# Patient Record
Sex: FEMALE | Race: WHITE
[De-identification: names, ages, dates, MRNs, and addresses within clinical notes are randomized per-mention and may not be internally consistent; named-entity substitution may affect disease eponyms.]

---

## 2019-10-03 ENCOUNTER — HOSPITAL ENCOUNTER (OUTPATIENT)
Dept: HOSPITAL 88 - ER | Age: 59
Setting detail: OBSERVATION
Discharge: LEFT BEFORE DISCHARGE | End: 2019-10-03
Attending: INTERNAL MEDICINE | Admitting: INTERNAL MEDICINE
Payer: COMMERCIAL

## 2019-10-03 VITALS — WEIGHT: 175 LBS | BODY MASS INDEX: 31.01 KG/M2 | HEIGHT: 63 IN

## 2019-10-03 DIAGNOSIS — N39.0: ICD-10-CM

## 2019-10-03 DIAGNOSIS — I10: ICD-10-CM

## 2019-10-03 DIAGNOSIS — I25.110: Primary | ICD-10-CM

## 2019-10-03 DIAGNOSIS — R10.13: ICD-10-CM

## 2019-10-03 DIAGNOSIS — F32.9: ICD-10-CM

## 2019-10-03 DIAGNOSIS — R11.0: ICD-10-CM

## 2019-10-03 DIAGNOSIS — G47.00: ICD-10-CM

## 2019-10-03 LAB
ALBUMIN SERPL-MCNC: 4.1 G/DL (ref 3.5–5)
ALBUMIN/GLOB SERPL: 1 {RATIO} (ref 0.8–2)
ALP SERPL-CCNC: 89 IU/L (ref 40–150)
ALT SERPL-CCNC: 27 IU/L (ref 0–55)
AMYLASE SERPL-CCNC: 81 U/L (ref 25–125)
ANION GAP SERPL CALC-SCNC: 13.8 MMOL/L (ref 8–16)
BACTERIA URNS QL MICRO: (no result) /HPF
BASOPHILS # BLD AUTO: 0 10*3/UL (ref 0–0.1)
BASOPHILS NFR BLD AUTO: 0.3 % (ref 0–1)
BILIRUB UR QL: NEGATIVE
BUN SERPL-MCNC: 10 MG/DL (ref 7–26)
BUN/CREAT SERPL: 14 (ref 6–25)
CALCIUM SERPL-MCNC: 10.6 MG/DL (ref 8.4–10.2)
CHLORIDE SERPL-SCNC: 101 MMOL/L (ref 98–107)
CK MB SERPL-MCNC: 0.6 NG/ML (ref 0–5)
CK MB SERPL-MCNC: 1 NG/ML (ref 0–5)
CK SERPL-CCNC: 31 IU/L (ref 29–168)
CK SERPL-CCNC: 38 IU/L (ref 29–168)
CLARITY UR: (no result)
CO2 SERPL-SCNC: 28 MMOL/L (ref 22–29)
COLOR UR: YELLOW
DEPRECATED NEUTROPHILS # BLD AUTO: 7 10*3/UL (ref 2.1–6.9)
DEPRECATED RBC URNS MANUAL-ACNC: (no result) /HPF (ref 0–5)
EGFRCR SERPLBLD CKD-EPI 2021: > 60 ML/MIN (ref 60–?)
EOSINOPHIL # BLD AUTO: 0.2 10*3/UL (ref 0–0.4)
EOSINOPHIL NFR BLD AUTO: 1.8 % (ref 0–6)
EPI CELLS URNS QL MICRO: (no result) /LPF
ERYTHROCYTE [DISTWIDTH] IN CORD BLOOD: 12.1 % (ref 11.7–14.4)
GLOBULIN PLAS-MCNC: 4 G/DL (ref 2.3–3.5)
GLUCOSE SERPLBLD-MCNC: 111 MG/DL (ref 74–118)
HCT VFR BLD AUTO: 41.6 % (ref 34.2–44.1)
HGB BLD-MCNC: 14.2 G/DL (ref 12–16)
KETONES UR QL STRIP.AUTO: NEGATIVE
LEUKOCYTE ESTERASE UR QL STRIP.AUTO: (no result)
LIPASE SERPL-CCNC: 102 U/L (ref 8–78)
LYMPHOCYTES # BLD: 3.3 10*3/UL (ref 1–3.2)
LYMPHOCYTES NFR BLD AUTO: 26.9 % (ref 18–39.1)
MCH RBC QN AUTO: 30.1 PG (ref 28–32)
MCHC RBC AUTO-ENTMCNC: 34.1 G/DL (ref 31–35)
MCV RBC AUTO: 88.3 FL (ref 81–99)
MONOCYTES # BLD AUTO: 1.5 10*3/UL (ref 0.2–0.8)
MONOCYTES NFR BLD AUTO: 12.2 % (ref 4.4–11.3)
NEUTS SEG NFR BLD AUTO: 58.2 % (ref 38.7–80)
NITRITE UR QL STRIP.AUTO: NEGATIVE
PLATELET # BLD AUTO: 372 X10E3/UL (ref 140–360)
POTASSIUM SERPL-SCNC: 3.8 MMOL/L (ref 3.5–5.1)
PROT UR QL STRIP.AUTO: NEGATIVE
RBC # BLD AUTO: 4.71 X10E6/UL (ref 3.6–5.1)
RENAL EPI CELLS URNS QL MICRO: (no result)
SODIUM SERPL-SCNC: 139 MMOL/L (ref 136–145)
SP GR UR STRIP: >=1.03 (ref 1.01–1.02)
UROBILINOGEN UR STRIP-MCNC: 0.2 MG/DL (ref 0.2–1)
WBC #/AREA URNS HPF: (no result) /HPF (ref 0–5)

## 2019-10-03 PROCEDURE — 36415 COLL VENOUS BLD VENIPUNCTURE: CPT

## 2019-10-03 PROCEDURE — 83690 ASSAY OF LIPASE: CPT

## 2019-10-03 PROCEDURE — 87086 URINE CULTURE/COLONY COUNT: CPT

## 2019-10-03 PROCEDURE — 82150 ASSAY OF AMYLASE: CPT

## 2019-10-03 PROCEDURE — 85379 FIBRIN DEGRADATION QUANT: CPT

## 2019-10-03 PROCEDURE — 82550 ASSAY OF CK (CPK): CPT

## 2019-10-03 PROCEDURE — 93005 ELECTROCARDIOGRAM TRACING: CPT

## 2019-10-03 PROCEDURE — 81001 URINALYSIS AUTO W/SCOPE: CPT

## 2019-10-03 PROCEDURE — 80053 COMPREHEN METABOLIC PANEL: CPT

## 2019-10-03 PROCEDURE — 74176 CT ABD & PELVIS W/O CONTRAST: CPT

## 2019-10-03 PROCEDURE — 99284 EMERGENCY DEPT VISIT MOD MDM: CPT

## 2019-10-03 PROCEDURE — 71046 X-RAY EXAM CHEST 2 VIEWS: CPT

## 2019-10-03 PROCEDURE — 84484 ASSAY OF TROPONIN QUANT: CPT

## 2019-10-03 PROCEDURE — 85025 COMPLETE CBC W/AUTO DIFF WBC: CPT

## 2019-10-03 PROCEDURE — 82553 CREATINE MB FRACTION: CPT

## 2019-10-03 NOTE — NUR
pt spoke with dimitris case and stated she needed her iv removed because she is 
leaving; dimitris spoke with patsy rn and asked what was going on and nurse went 
into pt room to find out; pt states she has things to do and she can't be here 
any longer and she didn't realize she'd be here this long and says she needs 
her iv removed so she can leave; nurse informs pt of risk/benefits of 
staying/leaving and pt acknowledges she understands; breana hendricks spoke with pt 
prior to pt signing ama and again explains risks/benefits of staying/leaving 
and pt again acknowledges she understands and that she needs to leave; iv 
removed and pt signs ama; galina moody (on call) notified of pt signing ama

## 2019-10-03 NOTE — DIAGNOSTIC IMAGING REPORT
EXAM: CT Abdomen and Pelvis WITHOUT contrast  

INDICATION: Flank pain  

COMPARISON: None.

TECHNIQUE: Abdomen and pelvis were scanned utilizing a multidetector helical

scanner from the lung base to the pubic symphysis without administration of IV

contrast. Absence of intravenous contrast decreases sensitivity for detection

of focal lesions and vascular pathology. Coronal and sagittal reformations were

obtained. Routine protocol was performed. 

     IV CONTRAST: None

     ORAL CONTRAST: None

            

COMPLICATIONS: None



RADIATION DOSE:

     Total DLP: 469 mGy*cm

     Estimated effective dose: (DLP x 0.015 x size factor) mSv

     CTDIvol has been reviewed. It is below the limits set by the Radiation

Protocol Committee (RPC).

     Dose modulation, iterative reconstruction, and/or weight based adjustment

of the mA/kV was utilized to reduce the radiation dose to as low as reasonably

achievable. 



FINDINGS:



LINES and TUBES: None.



LOWER THORAX:  Unremarkable



HEPATOBILIARY:      No focal hepatic lesions. No biliary ductal dilation. 



GALLBLADDER: There are cholecystectomy clips.  



SPLEEN: No splenomegaly. 



PANCREAS: No focal masses or ductal dilatation.  



ADRENALS: No adrenal nodules    



KIDNEYS/URETERS:  No hydronephrosis. No cystic or solid mass lesions.  No

stones.



GI TRACT: No abnormal distention, wall thickening, or evidence of bowel

obstruction.  There are diverticula within the colon without evidence of

diverticulitis.  Appendix is normal.



PELVIC ORGANS/BLADDER: Unremarkable.



LYMPH NODES: No lymphadenopathy.



VESSELS: Unremarkable.



PERITONEUM / RETROPERITONEUM: No free air or fluid.



BONES: There are degenerative changes in the lumbar spine.



SOFT TISSUES: There is a fat containing para-umbilical hernia.            



IMPRESSION: 



No acute abnormalities in the abdomen or pelvis on this noncontrast abdominal

CT.



Signed by: Cm Camargo DO on 10/3/2019 2:35 AM

## 2019-10-03 NOTE — DIAGNOSTIC IMAGING REPORT
EXAMINATION:  CHEST 2 VIEWS    



INDICATION: chest pain, abdominal pain



COMPARISON:  None

     

FINDINGS:  PA and lateral views



TUBES and LINES:  None.



LUNGS:  Lungs are well inflated.  Lungs are clear.  There is no evidence of

pneumonia or pulmonary edema.



PLEURA:  No pleural effusion or pneumothorax.



HEART AND MEDIASTINUM:  The cardiomediastinal silhouette is unremarkable.    



BONES AND SOFT TISSUES:  No acute osseous lesion.  Soft tissues are

unremarkable. Degenerative changes in the spine.



UPPER ABDOMEN: No free air under the diaphragm. Surgical clips in the right

upper quadrant.



IMPRESSION: 



No acute thoracic radiographic abnormality.





Signed by: Cm Camargo DO on 10/3/2019 2:31 AM

## 2019-10-03 NOTE — XMS REPORT
Patient Summary Document

                             Created on: 10/03/2019



BECKA BALL

External Reference #: [id number]

: 1960

Sex: Female



Demographics







                          Address                   [address] [place] [address]  [id number]

 

                          Home Phone                [phone]

 

                          Preferred Language        Unknown

 

                          Marital Status            Unknown

 

                          Denominational Affiliation     Unknown

 

                          Race                      Unknown

 

                                        Additional Race(s)  

 

                          Ethnic Group              Unknown





Author







                          Author                    [facility]

 

                          Address                   Unknown

 

                          Phone                     Unavailable







Care Team Providers







                    Care Team Member Name    Role                Phone

 

                    ASHOK HDEZ       Unavailable         Unavailable







Problems

This patient has no known problems.



Allergies, Adverse Reactions, Alerts

This patient has no known allergies or adverse reactions.



Medications

This patient has no known medications.



Results







           Test Description    Test Time    Test Comments    Text Results    Atomic Results    Result

 Comments

 

                CT ABDOMEN/PELVIS WO    2019-10-03 02:31:00                                                      

                                                    [place] [address]      Patient Name: BECKA BALL                                  
MR #: [id number]                     : 1960                            
      Age/Sex: 59/F  Acct #: [id number]                              Req #: 
[id number]  Adm Physician:                                                      
Ordered by: TABITHA HDEZ MD                            Report #: [id number]     
  Location: ER                                      Room/Bed:                   
 
___________________________________________________________________________________________________
   Procedure: [id number] CT/CT ABDOMEN/PELVIS WO  Exam Date: 10/03/19            
               Exam Time: 0130                                              
REPORT STATUS: Signed    EXAM: CT Abdomen and Pelvis WITHOUT contrast     INDIC
ATION: Flank pain     COMPARISON: None.   TECHNIQUE: Abdomen and pelvis were 
scanned utilizing a multidetector helical   scanner from the lung base to the 
pubic symphysis without administration of IV   contrast. Absence of intravenous 
contrast decreases sensitivity for detection   of focal lesions and vascular 
pathology. Coronal and sagittal reformations were   obtained. Routine protocol 
was performed.         IV CONTRAST: None        ORAL CONTRAST: None             
    COMPLICATIONS: None      RADIATION DOSE:        Total DLP: 469 mGy*cm       
Estimated effective dose: (DLP x 0.015 x size factor) mSv        CTDIvol has 
been reviewed. It is below the limits set by the Radiation   Protocol Committee 
(RPC).        Dose modulation, iterative reconstruction, and/or weight based 
adjustment   of the mA/kV was utilized to reduce the radiation dose to as low as
reasonably   achievable.       FINDINGS:      LINES and TUBES: None.      LOWER 
THORAX:  Unremarkable      HEPATOBILIARY:      No focal hepatic lesions. No 
biliary ductal dilation.       GALLBLADDER: There are cholecystectomy clips.    
   SPLEEN: No splenomegaly.       PANCREAS: No focal masses or ductal 
dilatation.        ADRENALS: No adrenal nodules          KIDNEYS/URETERS:  No 
hydronephrosis. No cystic or solid mass lesions.  No   stones.      GI TRACT: No
abnormal distention, wall thickening, or evidence of bowel   obstruction.  There
are diverticula within the colon without evidence of   diverticulitis.  Appendix
is normal.      PELVIC ORGANS/BLADDER: Unremarkable.      LYMPH NODES: No 
lymphadenopathy.      VESSELS: Unremarkable.      PERITONEUM / RETROPERITONEUM: 
No free air or fluid.      BONES: There are degenerative changes in the lumbar 
spine.      SOFT TISSUES: There is a fat containing para-umbilical hernia.      
           IMPRESSION:       No acute abnormalities in the abdomen or pelvis on 
this noncontrast abdominal   CT.      Signed by: Cm Camargo DO on 10/3/2019
2:35 AM        Dictated By: CM CAMARGO DO  Electronically Signed By: 
CM CAMARGO DO on 10/03/19 0235  Transcribed By: BRITTANI on 10/03/19 0235  
    COPY TO:   TABITHA HDEZ MD              

 

                CHEST 2 VIEWS    2019-10-03 02:30:00                                                             

                                             [place] [address]  
   Patient Name: BECKA BALL                                   MR #: 
[id number]                     : 1960                                  
Age/Sex: 59/F  Acct #: [id number]                              Req #: [id number]
[id number]  Adm Physician:                                                      
Ordered by: TABITHA HDEZ MD                            Report #: [id number]     
  Location: ER                                      Room/Bed:                   
 
___________________________________________________________________________________________________
   Procedure: [id number] DX/CHEST 2 VIEWS  Exam Date: 10/03/19                   
        Exam Time: 130                                              REPORT 
STATUS: Signed    EXAMINATION:  CHEST 2 VIEWS          INDICATION: chest pain, 
abdominal pain      COMPARISON:  None           FINDINGS:  PA and lateral views 
    TUBES and LINES:  None.      LUNGS:  Lungs are well inflated.  Lungs are 
clear.  There is no evidence of   pneumonia or pulmonary edema.      PLEURA:  No
pleural effusion or pneumothorax.      HEART AND MEDIASTINUM:  The 
cardiomediastinal silhouette is unremarkable.          BONES AND SOFT TISSUES:  
No acute osseous lesion.  Soft tissues are   unremarkable. Degenerative changes 
in the spine.      UPPER ABDOMEN: No free air under the diaphragm. Surgical 
clips in the right   upper quadrant.      IMPRESSION:       No acute thoracic 
radiographic abnormality.         Signed by: Cm Camargo DO on 10/3/2019 
2:31 AM        Dictated By: CM CAMARGO DO  Electronically Signed By: 
CM CAMARGO DO on 10/03/19 0231  Transcribed By: BRITTANI on 10/03/19 0231  
    COPY TO:   TABITHA HDEZ MD

## 2023-01-20 ENCOUNTER — HOSPITAL ENCOUNTER (EMERGENCY)
Dept: HOSPITAL 88 - ER | Age: 63
Discharge: HOME | End: 2023-01-20
Payer: COMMERCIAL

## 2023-01-20 VITALS — BODY MASS INDEX: 31.01 KG/M2 | WEIGHT: 175 LBS | HEIGHT: 63 IN

## 2023-01-20 DIAGNOSIS — N30.90: ICD-10-CM

## 2023-01-20 DIAGNOSIS — R50.9: Primary | ICD-10-CM

## 2023-01-20 DIAGNOSIS — G47.00: ICD-10-CM

## 2023-01-20 DIAGNOSIS — R10.30: ICD-10-CM

## 2023-01-20 DIAGNOSIS — I10: ICD-10-CM

## 2023-01-20 DIAGNOSIS — F32.A: ICD-10-CM

## 2023-01-20 LAB
BACTERIA URNS QL MICRO: (no result) /HPF
CLARITY UR: (no result)
COLOR UR: YELLOW
DEPRECATED RBC URNS MANUAL-ACNC: (no result) /HPF (ref 0–5)
EPI CELLS URNS QL MICRO: (no result) /LPF
KETONES UR QL STRIP.AUTO: (no result)
LEUKOCYTE ESTERASE UR QL STRIP.AUTO: (no result)
NITRITE UR QL STRIP.AUTO: NEGATIVE
NON-SQ EPI CELLS URNS QL MICRO: (no result)
PROT UR QL STRIP.AUTO: NEGATIVE
SP GR UR STRIP: 1.02 (ref 1.01–1.02)
UROBILINOGEN UR STRIP-MCNC: 0.2 MG/DL (ref 0.2–1)
WBC #/AREA URNS HPF: >50 /HPF (ref 0–5)

## 2023-01-20 PROCEDURE — 99283 EMERGENCY DEPT VISIT LOW MDM: CPT

## 2023-01-20 PROCEDURE — 81001 URINALYSIS AUTO W/SCOPE: CPT
